# Patient Record
Sex: FEMALE | Race: WHITE | NOT HISPANIC OR LATINO | Employment: FULL TIME | URBAN - METROPOLITAN AREA
[De-identification: names, ages, dates, MRNs, and addresses within clinical notes are randomized per-mention and may not be internally consistent; named-entity substitution may affect disease eponyms.]

---

## 2017-01-05 ENCOUNTER — OFFICE VISIT (OUTPATIENT)
Dept: OBSTETRICS AND GYNECOLOGY | Facility: CLINIC | Age: 27
End: 2017-01-05

## 2017-01-05 VITALS
WEIGHT: 293 LBS | DIASTOLIC BLOOD PRESSURE: 80 MMHG | HEIGHT: 68 IN | BODY MASS INDEX: 44.41 KG/M2 | RESPIRATION RATE: 14 BRPM | SYSTOLIC BLOOD PRESSURE: 110 MMHG

## 2017-01-05 DIAGNOSIS — Z01.419 WELL FEMALE EXAM WITH ROUTINE GYNECOLOGICAL EXAM: Primary | ICD-10-CM

## 2017-01-05 PROCEDURE — 99395 PREV VISIT EST AGE 18-39: CPT | Performed by: OBSTETRICS & GYNECOLOGY

## 2017-01-05 NOTE — MR AVS SNAPSHOT
Jazlyn Rivas   2017 10:00 AM   Office Visit    Dept Phone:  [phone]   Encounter #:  [id number]    Provider:  Mamadou Antoine MD   Department:  [facility]                Your Full Care Plan              Your Updated Medication List          This list is accurate as of: 17 12:03 PM.  Always use your most recent med list.                norgestimate-ethinyl estradiol 0.25-35 MG-MCG per tablet   Commonly known as:  ORTHO-CYCLEN   Take 1 tablet by mouth daily.               You Were Diagnosed With        Codes Comments    Well female exam with routine gynecological exam    -  Primary ICD-10-CM: Z01.419  ICD-9-CM: V72.31       Instructions     None    Patient Instructions History      Upcoming Appointments     Visit Type Date Time Department    ANNUAL 2017 10:00 AM [facility]      [employer] Signup     [facility] [employer] allows you to send messages to your doctor, view your test results, renew your prescriptions, schedule appointments, and more. To sign up, go to ustyme and click on the Sign Up Now link in the New User? box. Enter your D[employer]mo Activation Code exactly as it appears below along with the last four digits of your Social Security Number and your Date of Birth () to complete the sign-up process. If you do not sign up before the expiration date, you must request a new code.    Dizmo Activation Code: [email or web address]  Expires: 2017 12:03 PM    If you have questions, you can email [email or web address] or call [phone] to talk to our [employer] staff. Remember, Dizmo is NOT to be used for urgent needs. For medical emergencies, dial 911.               Other Info from Your Visit           Allergies     No Known Allergies      Reason for Visit     Gynecologic Exam annual      Vital Signs     Blood Pressure Respirations Height Weight Last Menstrual Period Body Mass Index    110/80  "14 68\" (172.7 cm) 300 lb (136 kg) 12/13/2016 45.61 kg/m2    Smoking Status                   Never Smoker           Problems and Diagnoses Noted     Well female exam with routine gynecological exam    -  Primary        "

## 2017-01-05 NOTE — PROGRESS NOTES
"Chief Complaint: Needs annual exam    Jazlyn Rivas is a 26 y.o. nulligravida LMP 12/13/16 presented for annual exam.  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  She's been doing well continues on OCPs-helps with dysmenorrhea and ovarian cyst formation.  No gynecologic concerns no urinary or bowel symptoms      Pertinent items are noted in HPI.       Visit Vitals   • /80   • Resp 14   • Ht 68\" (172.7 cm)   • Wt 300 lb (136 kg)   • LMP 12/13/2016   • BMI 45.61 kg/m2            Physical Exam    General well developed; well nourished  no acute distress   Skin No suspicious lesions seen   Thyroid normal to inspection and palpation   Lungs breathing is unlabored  clear to auscultation bilaterally   Cardiac regular rate and rhythm, S1, S2 normal, no murmur, click, rub or gallop   Breasts Examined in supine position  Symmetric without masses or skin dimpling  Nipples normal without inversion, lesions or discharge  There are no palpable axillary nodes   Abdomen soft, non-tender; no masses  no umbilical or inginual hernias are present  no hepato-splenomegaly   [facility] staff was present for exam  External genitalia:  normal appearance of the external genitalia including Bartholin's and Zemple's glands  :  urethral meatus normal and urethral hypermobility is absent  Vaginal:  normal pink mucosa without prolapse or lesions  Cervix:  normal appearance and Pap smear obtained  Uterus:  normal size, shape and consistency  Adnexa:  normal bimanual exam of the adnexa  Rectal:  digital rectal exam not performed, anus visually normal appearing       Assessment:   1. Normal annual exam  2. Status post left ovarian cystectomy doing well  3. Continues on OCPs-helps with ovarian cysts and dysmenorrhea    Plan:  1. Ortho-Cyclen    "

## 2017-02-07 RX ORDER — NORGESTIMATE AND ETHINYL ESTRADIOL 0.25-0.035
KIT ORAL
Qty: 28 TABLET | Refills: 12 | Status: SHIPPED | OUTPATIENT
Start: 2017-02-07 | End: 2017-09-15 | Stop reason: SDUPTHER

## 2017-04-06 ENCOUNTER — TELEPHONE (OUTPATIENT)
Dept: OBSTETRICS AND GYNECOLOGY | Facility: CLINIC | Age: 27
End: 2017-04-06

## 2017-04-06 NOTE — TELEPHONE ENCOUNTER
----- Message from Kang To sent at 4/6/2017  2:41 PM EDT -----  [phone]    PT HAS BEEN ON BC TO KEEP CYST UNDER CONTROL. HAS STARTED HAVING TROUBLE WITH BIRTH CONTROL. HAS ALWAYS STARTED WHEN SUPPOSED TO, BUT TODAY HAS STARTED EARLIER.

## 2017-04-06 NOTE — TELEPHONE ENCOUNTER
Dr. Kent pt. States she has been on current ocp's x 1 year and has always had a period on time. This month she has started a full period 1 1/2 weeks earlier than suppose too. She would like to know if there is something to do or if this is ok? She has not missed any pills this month.

## 2017-04-06 NOTE — TELEPHONE ENCOUNTER
Spoke with patient.  Informed her that the breakthrough bleeding was likely an anomaly considering that she's had no stressors that could throw her cycle off.  Patient has decided to stop taking oral contraceptives as she will be getting  in 3 months and is unconcerned about pregnancy.

## 2017-09-15 ENCOUNTER — TELEPHONE (OUTPATIENT)
Dept: OBSTETRICS AND GYNECOLOGY | Facility: CLINIC | Age: 27
End: 2017-09-15

## 2017-09-15 RX ORDER — NORGESTIMATE AND ETHINYL ESTRADIOL 0.25-0.035
1 KIT ORAL DAILY
Qty: 28 TABLET | Refills: 3 | Status: SHIPPED | OUTPATIENT
Start: 2017-09-15

## 2017-09-15 NOTE — TELEPHONE ENCOUNTER
Patient needs a refill birth control mononessa 0.25-35 MG Call it in to [facility] [address] [phone]

## 2019-10-22 ENCOUNTER — TRANSCRIBE ORDERS (OUTPATIENT)
Dept: LAB | Facility: HOSPITAL | Age: 29
End: 2019-10-22

## 2019-10-22 ENCOUNTER — LAB (OUTPATIENT)
Dept: LAB | Facility: HOSPITAL | Age: 29
End: 2019-10-22

## 2019-10-22 DIAGNOSIS — N92.6 IRREGULAR MENSTRUAL CYCLE: ICD-10-CM

## 2019-10-22 DIAGNOSIS — N92.6 IRREGULAR MENSTRUAL CYCLE: Primary | ICD-10-CM

## 2019-10-22 LAB
CHOLEST SERPL-MCNC: 207 MG/DL (ref 0–200)
ESTRADIOL SERPL HS-MCNC: 43.9 PG/ML
HBA1C MFR BLD: 5.8 % (ref 4.8–5.6)
HDLC SERPL-MCNC: 40 MG/DL (ref 40–60)
LDLC SERPL CALC-MCNC: 147 MG/DL (ref 0–100)
LDLC/HDLC SERPL: 3.67 {RATIO}
TESTOST SERPL-MCNC: 18.2 NG/DL (ref 8.4–48.1)
TRIGL SERPL-MCNC: 102 MG/DL (ref 0–150)
TSH SERPL DL<=0.05 MIU/L-ACNC: 1.46 UIU/ML (ref 0.27–4.2)
VLDLC SERPL-MCNC: 20.4 MG/DL (ref 5–40)

## 2019-10-22 PROCEDURE — 84402 ASSAY OF FREE TESTOSTERONE: CPT

## 2019-10-22 PROCEDURE — 36415 COLL VENOUS BLD VENIPUNCTURE: CPT

## 2019-10-22 PROCEDURE — 80061 LIPID PANEL: CPT

## 2019-10-22 PROCEDURE — 84403 ASSAY OF TOTAL TESTOSTERONE: CPT

## 2019-10-22 PROCEDURE — 83036 HEMOGLOBIN GLYCOSYLATED A1C: CPT

## 2019-10-22 PROCEDURE — 82627 DEHYDROEPIANDROSTERONE: CPT

## 2019-10-22 PROCEDURE — 83498 ASY HYDROXYPROGESTERONE 17-D: CPT

## 2019-10-22 PROCEDURE — 84443 ASSAY THYROID STIM HORMONE: CPT

## 2019-10-23 LAB — SPECIMEN STATUS: NORMAL

## 2019-10-24 LAB — DHEA-S SERPL-MCNC: 88.1 UG/DL (ref 84.8–378)

## 2019-10-25 LAB — TESTOST FREE SERPL-MCNC: 1.2 PG/ML (ref 0–4.2)

## 2019-10-26 LAB — 17OHP SERPL-MCNC: 18 NG/DL

## 2019-11-11 ENCOUNTER — APPOINTMENT (OUTPATIENT)
Dept: LAB | Facility: HOSPITAL | Age: 29
End: 2019-11-11

## 2019-11-11 ENCOUNTER — TRANSCRIBE ORDERS (OUTPATIENT)
Dept: LAB | Facility: HOSPITAL | Age: 29
End: 2019-11-11

## 2019-11-11 DIAGNOSIS — E28.2 POLYCYSTIC OVARIES: Primary | ICD-10-CM

## 2019-11-11 LAB — PROGEST SERPL-MCNC: 2.63 NG/ML

## 2019-11-11 PROCEDURE — 36415 COLL VENOUS BLD VENIPUNCTURE: CPT | Performed by: OBSTETRICS & GYNECOLOGY

## 2019-11-11 PROCEDURE — 84144 ASSAY OF PROGESTERONE: CPT | Performed by: OBSTETRICS & GYNECOLOGY
